# Patient Record
Sex: MALE | Race: OTHER | HISPANIC OR LATINO | URBAN - METROPOLITAN AREA
[De-identification: names, ages, dates, MRNs, and addresses within clinical notes are randomized per-mention and may not be internally consistent; named-entity substitution may affect disease eponyms.]

---

## 2019-04-10 ENCOUNTER — OUTPATIENT (OUTPATIENT)
Dept: OUTPATIENT SERVICES | Facility: HOSPITAL | Age: 39
LOS: 1 days | Discharge: HOME | End: 2019-04-10
Payer: MEDICAID

## 2019-04-10 DIAGNOSIS — M79.644 PAIN IN RIGHT FINGER(S): ICD-10-CM

## 2019-04-10 PROCEDURE — 73140 X-RAY EXAM OF FINGER(S): CPT | Mod: 26,RT

## 2020-04-05 ENCOUNTER — EMERGENCY (EMERGENCY)
Facility: HOSPITAL | Age: 40
LOS: 0 days | Discharge: HOME | End: 2020-04-05
Admitting: EMERGENCY MEDICINE
Payer: OTHER MISCELLANEOUS

## 2020-04-05 VITALS
OXYGEN SATURATION: 100 % | TEMPERATURE: 98 F | DIASTOLIC BLOOD PRESSURE: 81 MMHG | SYSTOLIC BLOOD PRESSURE: 118 MMHG | HEART RATE: 105 BPM | RESPIRATION RATE: 18 BRPM

## 2020-04-05 DIAGNOSIS — F17.200 NICOTINE DEPENDENCE, UNSPECIFIED, UNCOMPLICATED: ICD-10-CM

## 2020-04-05 DIAGNOSIS — Z23 ENCOUNTER FOR IMMUNIZATION: ICD-10-CM
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
## 2020-04-05 DIAGNOSIS — S61.311A LACERATION WITHOUT FOREIGN BODY OF LEFT INDEX FINGER WITH DAMAGE TO NAIL, INITIAL ENCOUNTER: ICD-10-CM
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
## 2020-04-05 PROCEDURE — 99283 EMERGENCY DEPT VISIT LOW MDM: CPT

## 2020-04-05 RX ORDER — TETANUS TOXOID, REDUCED DIPHTHERIA TOXOID AND ACELLULAR PERTUSSIS VACCINE, ADSORBED 5; 2.5; 8; 8; 2.5 [IU]/.5ML; [IU]/.5ML; UG/.5ML; UG/.5ML; UG/.5ML
0.5 SUSPENSION INTRAMUSCULAR ONCE
Refills: 0 | Status: COMPLETED | OUTPATIENT
Start: 2020-04-05 | End: 2020-04-05

## 2020-04-05 RX ADMIN — TETANUS TOXOID, REDUCED DIPHTHERIA TOXOID AND ACELLULAR PERTUSSIS VACCINE, ADSORBED 0.5 MILLILITER(S): 5; 2.5; 8; 8; 2.5 SUSPENSION INTRAMUSCULAR at 10:27

## 2020-04-05 NOTE — ED PROVIDER NOTE - OBJECTIVE STATEMENT
40 y/o male with a PMH of asthma presents to the ED for evaluation of left distal tip of the index finger laceration that occurred this morning around 9AM while chopping parsley with a knife at work. pt is right hand dominant. pt cannot recall last tetanus. pt denies numbness, tingling,  or weakness.

## 2020-04-05 NOTE — ED PROVIDER NOTE - PHYSICAL EXAMINATION
Physical Exam    Vital Signs: I have reviewed the initial vital signs.  Constitutional: well-nourished, appears stated age, no acute distress    Musculoskeletal: FROM of the left digits. left radial pulse 2+ and intact. FROM of left digits.   Integumentary: warm, dry. (+)  distal tip of the left index finger with skin avulsion, including partial nail avulsion. no tendon or bone exposed.   Neurologic: median, radial, and ulnar nerve sensation and motor function intact b/l.

## 2020-04-05 NOTE — ED ADULT NURSE NOTE - OBJECTIVE STATEMENT
Pt. c/o of Left 2nd finger laceration while cutting food. Pt. denies pain at this time. No active bleeding noted

## 2020-04-05 NOTE — ED PROVIDER NOTE - NSFOLLOWUPINSTRUCTIONS_ED_ALL_ED_FT
Skin Avulsion    WHAT YOU NEED TO KNOW:    Skin avulsion is a wound that happens when skin is torn from your body during an accident or other injury. The torn skin may be lost or too damaged to be repaired, and it must be removed. A wound of this type cannot be stitched closed because there is tissue missing. Avulsion wounds are usually bigger and have more scars because of the missing tissue.    DISCHARGE INSTRUCTIONS:    Medicines:     Antibiotic ointment: Your healthcare provider may tell you to gently rub a topical antibiotic ointment on your wound. This will help prevent an infection and help your wound heal faster.       Pain medicine: You may be given medicine to take away or decrease pain. Do not wait until the pain is severe before you take your medicine.      NSAIDs, such as ibuprofen, help decrease swelling, pain, and fever. This medicine is available with or without a doctor's order. NSAIDs can cause stomach bleeding or kidney problems in certain people. If you take blood thinner medicine, always ask if NSAIDs are safe for you. Always read the medicine label and follow directions. Do not give these medicines to children under 6 months of age without direction from your child's healthcare provider.      Take your medicine as directed. Contact your healthcare provider if you think your medicine is not helping or if you have side effects. Tell him of her if you are allergic to any medicine. Keep a list of the medicines, vitamins, and herbs you take. Include the amounts, and when and why you take them. Bring the list or the pill bottles to follow-up visits. Carry your medicine list with you in case of an emergency.    Care for your wound: Avulsion wounds may take longer to heal because they cannot be closed with tape or stitches. Keep your wound clean and protected to prevent infection and speed healing.     Clean your wound: Wash your hands with soap and water before and after you care for your wound. You may be able to use a soft cloth to gently clean the wound after the first 24 to 48 hours. After that, gently clean the wound once or twice a day with cool water. Do not soak your wound. Use soap to clean around the wound, but try not to get any on the wound itself. Do not use alcohol or hydrogen peroxide to clean your wound unless you are directed to. Gently pat the area dry and reapply the bandage as directed.       Elevate your wound: Prop your injured area on pillows to raise it above the level of your heart. This will help reduce pain and swelling. Do this for 30 minutes at a time, as often as you can.       Bandage your wound: Bandages keep your wound clean, dry, and protected from infection. They may also prevent swelling. Use a bandage that does not stick to your wound, and has a spongy layer to absorb fluids. Leave your bandage on as long as directed. Ask your healthcare provider when and how to change your bandage. Do not wrap the bandage too tightly. This could cut off blood flow and cause more injury.       Use cool compresses: Wet a washcloth or towel with cool water and hold it on your wound as directed. Ask how often to apply the compress and for how long each time.      Reduce scarring: Avoid direct sunlight on your wound. Sunlight may burn or change the color of the new skin over your wound. Use sunscreen (SPF 30 or higher) on the new skin for at least 1 year after it heals.    Support for leg and arm wounds: You may need to use crutches if the wound is on your leg. You may need to use a sling if the wound is on your arm. Crutches and slings help protect the injured area, prevent further injury, and heal the area in the right position.     Follow up with your healthcare provider within 2 days or as directed: If you have stitches, ask when to return to have them removed. Write down your questions so you remember to ask them during your visits.     Contact your healthcare provider if:     You have new pain, or it gets worse.       You have trouble moving the injured body area.       Your wound splits open or does not seem to be healing.    Return to the emergency department if:     You have a fever.       You have painful swelling, redness, or warmth around your wound.      Your wound is red and there are red streaks on your skin starting at your wound and moving upward.       Your wound is draining pus.       You have heavy bleeding or bleeding that does not stop after 10 minutes of holding firm, direct pressure over the wound.      You feel like there is an object stuck in your wound.          © Copyright [employer] 2019 All illustrations and images included in CareNotes are the copyrighted property of [employer] or [employer]

## 2020-04-05 NOTE — ED PROCEDURE NOTE - PROCEDURE ADDITIONAL DETAILS
pt  distal tip of the left index skin avulsion with partial nail avlusion copiously washed with normal saline and peroxide. finger evaluation under bloodless field using finger tourniquet. no bone or tendon involvement. gelfoam soaked in txa applied, finger wrapped with cling wrap. given tetanus vaccine. advised of return precaution such as fever, severe finger pain, loss of motor function in the left index finger, numbness, tingling, erythema of the left index finger, or pus drainage from the site.

## 2020-04-05 NOTE — ED PROVIDER NOTE - NSFOLLOWUPCLINICS_GEN_ALL_ED_FT
[facility]  [facility]  [address], [address]  [place] [address]  Phone: [phone]  Fax:   Follow Up Time:

## 2020-04-05 NOTE — ED PROVIDER NOTE - PROGRESS NOTE DETAILS
pt left index finger distal tip avulsion with partial nail involvement copiously washed with normal saline and peroxide. finger evaluation under bloodless field using finger tourniquet. no bone or tendon involvement. gelfoam soaked in txa applied, finger wrapped with cling wrap. given tetanus vaccine. advised of return precaution such as fever, severe finger pain, loss of motor function in the left index finger, numbness, tingling, erythema of the left index finger, or pus drainage from the site.

## 2020-04-05 NOTE — ED ADULT TRIAGE NOTE - CHIEF COMPLAINT QUOTE
Pt c/o lac to right second digit. Pt states he was cutting food when he accidently sliced finger.
(2) well flexed

## 2020-04-05 NOTE — ED PROVIDER NOTE - NS ED ROS FT
CONST: No fever, chills or bodyaches  MS: No joint pain, back pain or extremity pain/injury  SKIN: (+) left distal tip index finger laceration. No rashes  NEURO: No paresthesias

## 2020-04-05 NOTE — ED PROVIDER NOTE - PATIENT PORTAL LINK FT
You can access the FollowMyHealth Patient Portal offered by [facility] by registering at the following website: [email or web address]. By joining [employer]’s FollowMyHealth portal, you will also be able to view your health information using other applications (apps) compatible with our system.